# Patient Record
Sex: MALE | URBAN - METROPOLITAN AREA
[De-identification: names, ages, dates, MRNs, and addresses within clinical notes are randomized per-mention and may not be internally consistent; named-entity substitution may affect disease eponyms.]

---

## 2019-01-31 ENCOUNTER — OUTPATIENT (OUTPATIENT)
Dept: OUTPATIENT SERVICES | Facility: HOSPITAL | Age: 49
LOS: 1 days | Discharge: HOME | End: 2019-01-31
Payer: MEDICAID

## 2019-01-31 VITALS
HEART RATE: 85 BPM | DIASTOLIC BLOOD PRESSURE: 80 MMHG | TEMPERATURE: 98 F | HEIGHT: 65 IN | RESPIRATION RATE: 16 BRPM | WEIGHT: 186.95 LBS | SYSTOLIC BLOOD PRESSURE: 121 MMHG | OXYGEN SATURATION: 98 %

## 2019-01-31 VITALS
RESPIRATION RATE: 10 BRPM | OXYGEN SATURATION: 98 % | DIASTOLIC BLOOD PRESSURE: 78 MMHG | HEART RATE: 98 BPM | SYSTOLIC BLOOD PRESSURE: 117 MMHG

## 2019-01-31 PROCEDURE — 47562 LAPAROSCOPIC CHOLECYSTECTOMY: CPT

## 2019-01-31 PROCEDURE — 88304 TISSUE EXAM BY PATHOLOGIST: CPT

## 2019-01-31 RX ORDER — DOCUSATE SODIUM 100 MG
1 CAPSULE ORAL
Qty: 9 | Refills: 0 | OUTPATIENT
Start: 2019-01-31 | End: 2019-02-02

## 2019-01-31 NOTE — BRIEF OPERATIVE NOTE - POST-OP DX
Calculus of gallbladder with chronic cholecystitis without obstruction  01/31/2019    Active  Catarino Velásquez

## 2019-01-31 NOTE — BRIEF OPERATIVE NOTE - OPERATION/FINDINGS
Indication: previous attack of gallstone pancreatitis  Planned IOC was cancelled given patient allergy to shrimp (SOB and airway edema)  Kane's technique for laparoscopic access  3 other working ports under direct visualization  Critical view of safety obtained  Cystic duct and artery clipped and divided   gall bladder removal  Hemostasis secured  Primary umbilical hernia repair without mesh

## 2019-01-31 NOTE — BRIEF OPERATIVE NOTE - PROCEDURE
<<-----Click on this checkbox to enter Procedure Umbilical hernia repair  01/31/2019    Active  MABDALLAT  Laparoscopic cholecystectomy  01/31/2019    Active  MABDALLAT

## 2019-02-05 LAB — SURGICAL PATHOLOGY STUDY: SIGNIFICANT CHANGE UP
